# Patient Record
Sex: MALE | Race: WHITE
[De-identification: names, ages, dates, MRNs, and addresses within clinical notes are randomized per-mention and may not be internally consistent; named-entity substitution may affect disease eponyms.]

---

## 2019-09-27 ENCOUNTER — HOSPITAL ENCOUNTER (EMERGENCY)
Dept: HOSPITAL 72 - EMR | Age: 54
Discharge: HOME | End: 2019-09-27
Payer: MEDICAID

## 2019-09-27 VITALS — DIASTOLIC BLOOD PRESSURE: 86 MMHG | SYSTOLIC BLOOD PRESSURE: 131 MMHG

## 2019-09-27 VITALS — HEIGHT: 70 IN | BODY MASS INDEX: 28.49 KG/M2 | WEIGHT: 199 LBS

## 2019-09-27 DIAGNOSIS — K50.90: ICD-10-CM

## 2019-09-27 DIAGNOSIS — R51: Primary | ICD-10-CM

## 2019-09-27 LAB
ADD MANUAL DIFF: NO
ALBUMIN SERPL-MCNC: 3.2 G/DL (ref 3.4–5)
ALBUMIN/GLOB SERPL: 0.7 {RATIO} (ref 1–2.7)
ALP SERPL-CCNC: 91 U/L (ref 46–116)
ALT SERPL-CCNC: 22 U/L (ref 12–78)
ANION GAP SERPL CALC-SCNC: 12 MMOL/L (ref 5–15)
AST SERPL-CCNC: 19 U/L (ref 15–37)
BASOPHILS NFR BLD AUTO: 0.8 % (ref 0–2)
BILIRUB SERPL-MCNC: 0.5 MG/DL (ref 0.2–1)
BUN SERPL-MCNC: 11 MG/DL (ref 7–18)
CALCIUM SERPL-MCNC: 9 MG/DL (ref 8.5–10.1)
CHLORIDE SERPL-SCNC: 99 MMOL/L (ref 98–107)
CK SERPL-CCNC: 53 U/L (ref 26–308)
CO2 SERPL-SCNC: 27 MMOL/L (ref 21–32)
CREAT SERPL-MCNC: 1 MG/DL (ref 0.55–1.3)
EOSINOPHIL NFR BLD AUTO: 2.7 % (ref 0–3)
ERYTHROCYTE [DISTWIDTH] IN BLOOD BY AUTOMATED COUNT: 11.5 % (ref 11.6–14.8)
GLOBULIN SER-MCNC: 4.9 G/DL
HCT VFR BLD CALC: 38.9 % (ref 42–52)
HGB BLD-MCNC: 12.5 G/DL (ref 14.2–18)
LYMPHOCYTES NFR BLD AUTO: 20.3 % (ref 20–45)
MCV RBC AUTO: 84 FL (ref 80–99)
MONOCYTES NFR BLD AUTO: 8.3 % (ref 1–10)
NEUTROPHILS NFR BLD AUTO: 68 % (ref 45–75)
PLATELET # BLD: 334 K/UL (ref 150–450)
POTASSIUM SERPL-SCNC: 4.1 MMOL/L (ref 3.5–5.1)
RBC # BLD AUTO: 4.61 M/UL (ref 4.7–6.1)
SODIUM SERPL-SCNC: 137 MMOL/L (ref 136–145)
WBC # BLD AUTO: 10.6 K/UL (ref 4.8–10.8)

## 2019-09-27 PROCEDURE — 96375 TX/PRO/DX INJ NEW DRUG ADDON: CPT

## 2019-09-27 PROCEDURE — 93005 ELECTROCARDIOGRAM TRACING: CPT

## 2019-09-27 PROCEDURE — 85025 COMPLETE CBC W/AUTO DIFF WBC: CPT

## 2019-09-27 PROCEDURE — 80307 DRUG TEST PRSMV CHEM ANLYZR: CPT

## 2019-09-27 PROCEDURE — 96374 THER/PROPH/DIAG INJ IV PUSH: CPT

## 2019-09-27 PROCEDURE — 96361 HYDRATE IV INFUSION ADD-ON: CPT

## 2019-09-27 PROCEDURE — 80053 COMPREHEN METABOLIC PANEL: CPT

## 2019-09-27 PROCEDURE — 70450 CT HEAD/BRAIN W/O DYE: CPT

## 2019-09-27 PROCEDURE — 82550 ASSAY OF CK (CPK): CPT

## 2019-09-27 PROCEDURE — 83690 ASSAY OF LIPASE: CPT

## 2019-09-27 PROCEDURE — 99284 EMERGENCY DEPT VISIT MOD MDM: CPT

## 2019-09-27 PROCEDURE — 36415 COLL VENOUS BLD VENIPUNCTURE: CPT

## 2019-09-27 NOTE — NUR
ED Nurse Note:



Patient walked in to ER from the street due to severe headache x 3 days. Stated 
that never had headache like this, it is worse headache ever. Patient presented 
AAO x4, VSS at this time, skin is dry warm to touch.

## 2019-09-27 NOTE — EMERGENCY ROOM REPORT
History of Present Illness


General


Chief Complaint:  Headache


Source:  Patient





Present Illness


HPI


Patient presents with complaints of diffuse headache ongoing for the past 4 

days reports pain starting in the occipital region coming forward and involving 

the frontal sinus area





Pain has been slowly progressing and worsening denies any neck pain or 

photophobia denies any chest pain denies any back or flank pain


Denies any focal weakness denies any recent travel denies any rash


Pain is a throbbing and pressure type pain 8 out of 10





Denies any change with time of day denies any change with position or exertion


Allergies:  


Coded Allergies:  


     No Known Allergies (Unverified , 3/8/13)





Patient History


Past Medical History:  see triage record


Reviewed Nursing Documentation:  PMH: Agreed; PSxH: Agreed





Nursing Documentation-PMH


Hx Cardiac Problems:  No


Hx Cancer:  No


Hx Gastrointestinal Problems:  Yes - CROHN'S


Hx Neurological Problems:  No





Review of Systems


All Other Systems:  negative except mentioned in HPI





Physical Exam





Vital Signs








  Date Time  Temp Pulse Resp B/P (MAP) Pulse Ox O2 Delivery O2 Flow Rate FiO2


 


9/27/19 21:14 98.4 113 16 131/86 (101) 96 Room Air  








Sp02 EP Interpretation:  reviewed, normal


General Appearance:  well appearing, no apparent distress


Head:  normocephalic, atraumatic


Eyes:  bilateral eye PERRL, bilateral eye EOMI


ENT:  hearing grossly normal, normal pharynx, TMs + canals normal, uvula midline


Neck:  full range of motion, supple, no meningismus, no bony tend


Respiratory:  lungs clear, normal breath sounds, no rhonchi, no respiratory 

distress, no retraction, no accessory muscle use


Cardiovascular #1:  normal peripheral pulses, regular rate, rhythm, no edema, 

no gallop, no JVD, no murmur


Gastrointestinal:  normal bowel sounds, non tender, soft, no mass, no 

organomegaly, non-distended, no guarding, no hernia, no pulsatile mass, no 

rebound


Genitourinary:  no CVA tenderness


Musculoskeletal:  normal inspection


Neurologic:  oriented x3, responsive, CNs III-XII nml as tested, motor strength/

tone normal, sensory intact


Psychiatric:  mood/affect normal


Skin:  no rash


Lymphatic:  normal inspection, no adenopathy





Medical Decision Making


Diagnostic Impression:  


 Primary Impression:  


 Headache


ER Course


Multiple differentials including but not limited to neurological, neurosurgical

, infectious, metabolic pathology entertained patient has done


Significantly better with pain control


Extensive blood work and imaging was initiated all within normal limits patient 

remains hemodynamically stable repeat neurological exam is baseline and patient 

appropriate for close outpatient follow-up





Labs








Test


  9/27/19


21:40 9/27/19


21:50


 


White Blood Count


  10.6 K/UL


(4.8-10.8) 


 


 


Red Blood Count


  4.61 M/UL


(4.70-6.10) 


 


 


Hemoglobin


  12.5 G/DL


(14.2-18.0) 


 


 


Hematocrit


  38.9 %


(42.0-52.0) 


 


 


Mean Corpuscular Volume 84 FL (80-99)  


 


Mean Corpuscular Hemoglobin


  27.2 PG


(27.0-31.0) 


 


 


Mean Corpuscular Hemoglobin


Concent 32.2 G/DL


(32.0-36.0) 


 


 


Red Cell Distribution Width


  11.5 %


(11.6-14.8) 


 


 


Platelet Count


  334 K/UL


(150-450) 


 


 


Mean Platelet Volume


  7.9 FL


(6.5-10.1) 


 


 


Neutrophils (%) (Auto)


  68.0 %


(45.0-75.0) 


 


 


Lymphocytes (%) (Auto)


  20.3 %


(20.0-45.0) 


 


 


Monocytes (%) (Auto)


  8.3 %


(1.0-10.0) 


 


 


Eosinophils (%) (Auto)


  2.7 %


(0.0-3.0) 


 


 


Basophils (%) (Auto)


  0.8 %


(0.0-2.0) 


 


 


Sodium Level


  137 MMOL/L


(136-145) 


 


 


Potassium Level


  4.1 MMOL/L


(3.5-5.1) 


 


 


Chloride Level


  99 MMOL/L


() 


 


 


Carbon Dioxide Level


  27 MMOL/L


(21-32) 


 


 


Anion Gap


  12 mmol/L


(5-15) 


 


 


Blood Urea Nitrogen


  11 mg/dL


(7-18) 


 


 


Creatinine


  1.0 MG/DL


(0.55-1.30) 


 


 


Estimat Glomerular Filtration


Rate > 60 mL/min


(>60) 


 


 


Glucose Level


  101 MG/DL


() 


 


 


Calcium Level


  9.0 MG/DL


(8.5-10.1) 


 


 


Total Bilirubin


  0.5 MG/DL


(0.2-1.0) 


 


 


Aspartate Amino Transf


(AST/SGOT) 19 U/L (15-37) 


  


 


 


Alanine Aminotransferase


(ALT/SGPT) 22 U/L (12-78) 


  


 


 


Alkaline Phosphatase


  91 U/L


() 


 


 


Total Creatine Kinase


  53 U/L


() 


 


 


Total Protein


  8.1 G/DL


(6.4-8.2) 


 


 


Albumin


  3.2 G/DL


(3.4-5.0) 


 


 


Globulin 4.9 g/dL  


 


Albumin/Globulin Ratio 0.7 (1.0-2.7)  


 


Lipase


  77 U/L


() 


 


 


Urine Opiates Screen


  


  Negative


(NEGATIVE)


 


Urine Barbiturates Screen


  


  Negative


(NEGATIVE)


 


Phencyclidine (PCP) Screen


  


  Negative


(NEGATIVE)


 


Urine Amphetamines Screen


  


  Negative


(NEGATIVE)


 


Urine Benzodiazepines Screen


  


  Negative


(NEGATIVE)


 


Urine Cocaine Screen


  


  Negative


(NEGATIVE)


 


Urine Marijuana (THC) Screen


  


  Negative


(NEGATIVE)








Rhythm Strip Diag. Results


EP Interpretation:  yes


Rate:  77


Rhythm:  NSR, no PVC's, no ectopy





CT/MRI/US Diagnostic Results


CT/MRI/US Diagnostic Results :  


   Impression


CT head no acute disease





Last Vital Signs








  Date Time  Temp Pulse Resp B/P (MAP) Pulse Ox O2 Delivery O2 Flow Rate FiO2


 


9/27/19 21:14 98.4 113 16 131/86 (101) 96 Room Air  








Status:  improved


Disposition:  HOME, SELF-CARE


Condition:  Improved


Scripts


Ibuprofen* (MOTRIN*) 600 Mg Tablet


600 MG ORAL THREE TIMES A DAY, #20 TAB 0 Refills


   Prov: Kyle Singletary DO         9/27/19





Additional Instructions:  


Patient is provided with the discharge instructions notified to follow up with 

primary doctor in the next 2-3 days otherwise return to the er with any 

worsening symptoms.


Please note that this report is being documented using DRAGON technology.  This 

can lead to erroneous entry secondary to incorrect interpretation by the 

dictating instrument.











Kyle Singletary DO Sep 27, 2019 21:30

## 2019-09-30 NOTE — CARDIOLOGY REPORT
--------------- APPROVED REPORT --------------





EKG Measurement

Heart [id number]

AZ [id number]

[id number]

[id number]

[id number]





Sinus tachycardia

Inferior infarct, age undetermined

Cannot rule out Anterior infarct, age undetermined

Abnormal ECG

## 2019-10-02 ENCOUNTER — HOSPITAL ENCOUNTER (INPATIENT)
Dept: HOSPITAL 72 - EMR | Age: 54
LOS: 2 days | Discharge: ANOTHER HOSPITAL | DRG: 56 | End: 2019-10-04
Payer: MEDICAID

## 2019-10-02 VITALS — SYSTOLIC BLOOD PRESSURE: 122 MMHG | DIASTOLIC BLOOD PRESSURE: 78 MMHG

## 2019-10-02 VITALS — SYSTOLIC BLOOD PRESSURE: 138 MMHG | DIASTOLIC BLOOD PRESSURE: 86 MMHG

## 2019-10-02 VITALS — BODY MASS INDEX: 27.37 KG/M2 | WEIGHT: 195.5 LBS | HEIGHT: 71 IN

## 2019-10-02 VITALS — DIASTOLIC BLOOD PRESSURE: 84 MMHG | SYSTOLIC BLOOD PRESSURE: 151 MMHG

## 2019-10-02 VITALS — SYSTOLIC BLOOD PRESSURE: 146 MMHG | DIASTOLIC BLOOD PRESSURE: 73 MMHG

## 2019-10-02 DIAGNOSIS — R00.0: ICD-10-CM

## 2019-10-02 DIAGNOSIS — R10.9: ICD-10-CM

## 2019-10-02 DIAGNOSIS — K43.9: ICD-10-CM

## 2019-10-02 DIAGNOSIS — E87.2: ICD-10-CM

## 2019-10-02 DIAGNOSIS — N39.0: ICD-10-CM

## 2019-10-02 DIAGNOSIS — E46: ICD-10-CM
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
## 2019-10-02 DIAGNOSIS — K50.90: ICD-10-CM

## 2019-10-02 DIAGNOSIS — S06.379A: Primary | ICD-10-CM

## 2019-10-02 DIAGNOSIS — Z59.0: ICD-10-CM

## 2019-10-02 DIAGNOSIS — R55: ICD-10-CM

## 2019-10-02 DIAGNOSIS — K63.2: ICD-10-CM

## 2019-10-02 DIAGNOSIS — G91.9: ICD-10-CM

## 2019-10-02 DIAGNOSIS — E27.8: ICD-10-CM

## 2019-10-02 DIAGNOSIS — G92: ICD-10-CM

## 2019-10-02 LAB
ADD MANUAL DIFF: NO
ALBUMIN SERPL-MCNC: 3.3 G/DL (ref 3.4–5)
ALBUMIN/GLOB SERPL: 0.6 {RATIO} (ref 1–2.7)
ALP SERPL-CCNC: 100 U/L (ref 46–116)
ALT SERPL-CCNC: 25 U/L (ref 12–78)
ANION GAP SERPL CALC-SCNC: 18 MMOL/L (ref 5–15)
APPEARANCE UR: CLEAR
APTT PPP: YELLOW S
AST SERPL-CCNC: 24 U/L (ref 15–37)
BASOPHILS NFR BLD AUTO: 0.8 % (ref 0–2)
BILIRUB SERPL-MCNC: 0.6 MG/DL (ref 0.2–1)
BUN SERPL-MCNC: 22 MG/DL (ref 7–18)
CALCIUM SERPL-MCNC: 10 MG/DL (ref 8.5–10.1)
CHLORIDE SERPL-SCNC: 96 MMOL/L (ref 98–107)
CO2 SERPL-SCNC: 21 MMOL/L (ref 21–32)
CREAT SERPL-MCNC: 1.3 MG/DL (ref 0.55–1.3)
EOSINOPHIL NFR BLD AUTO: 1.8 % (ref 0–3)
ERYTHROCYTE [DISTWIDTH] IN BLOOD BY AUTOMATED COUNT: 12.5 % (ref 11.6–14.8)
GLOBULIN SER-MCNC: 5.3 G/DL
GLUCOSE UR STRIP-MCNC: NEGATIVE MG/DL
HCT VFR BLD CALC: 46.4 % (ref 42–52)
HGB BLD-MCNC: 14.8 G/DL (ref 14.2–18)
KETONES UR QL STRIP: (no result)
LEUKOCYTE ESTERASE UR QL STRIP: (no result)
LYMPHOCYTES NFR BLD AUTO: 13.3 % (ref 20–45)
MCV RBC AUTO: 83 FL (ref 80–99)
MONOCYTES NFR BLD AUTO: 7.8 % (ref 1–10)
NEUTROPHILS NFR BLD AUTO: 76.4 % (ref 45–75)
NITRITE UR QL STRIP: NEGATIVE
PH UR STRIP: 5 [PH] (ref 4.5–8)
PLATELET # BLD: 415 K/UL (ref 150–450)
POTASSIUM SERPL-SCNC: 3.8 MMOL/L (ref 3.5–5.1)
PROT UR QL STRIP: (no result)
RBC # BLD AUTO: 5.62 M/UL (ref 4.7–6.1)
SODIUM SERPL-SCNC: 135 MMOL/L (ref 136–145)
SP GR UR STRIP: 1.01 (ref 1–1.03)
UROBILINOGEN UR-MCNC: 1 MG/DL (ref 0–1)
WBC # BLD AUTO: 14.2 K/UL (ref 4.8–10.8)

## 2019-10-02 PROCEDURE — 96361 HYDRATE IV INFUSION ADD-ON: CPT

## 2019-10-02 PROCEDURE — 36415 COLL VENOUS BLD VENIPUNCTURE: CPT

## 2019-10-02 PROCEDURE — 94664 DEMO&/EVAL PT USE INHALER: CPT

## 2019-10-02 PROCEDURE — 84443 ASSAY THYROID STIM HORMONE: CPT

## 2019-10-02 PROCEDURE — 80061 LIPID PANEL: CPT

## 2019-10-02 PROCEDURE — 93880 EXTRACRANIAL BILAT STUDY: CPT

## 2019-10-02 PROCEDURE — 85025 COMPLETE CBC W/AUTO DIFF WBC: CPT

## 2019-10-02 PROCEDURE — 83690 ASSAY OF LIPASE: CPT

## 2019-10-02 PROCEDURE — 80329 ANALGESICS NON-OPIOID 1 OR 2: CPT

## 2019-10-02 PROCEDURE — 74177 CT ABD & PELVIS W/CONTRAST: CPT

## 2019-10-02 PROCEDURE — 93005 ELECTROCARDIOGRAM TRACING: CPT

## 2019-10-02 PROCEDURE — 96365 THER/PROPH/DIAG IV INF INIT: CPT

## 2019-10-02 PROCEDURE — 70450 CT HEAD/BRAIN W/O DYE: CPT

## 2019-10-02 PROCEDURE — 80053 COMPREHEN METABOLIC PANEL: CPT

## 2019-10-02 PROCEDURE — 87081 CULTURE SCREEN ONLY: CPT

## 2019-10-02 PROCEDURE — 81001 URINALYSIS AUTO W/SCOPE: CPT

## 2019-10-02 PROCEDURE — 99285 EMERGENCY DEPT VISIT HI MDM: CPT

## 2019-10-02 PROCEDURE — 84484 ASSAY OF TROPONIN QUANT: CPT

## 2019-10-02 PROCEDURE — 96367 TX/PROPH/DG ADDL SEQ IV INF: CPT

## 2019-10-02 PROCEDURE — 85730 THROMBOPLASTIN TIME PARTIAL: CPT

## 2019-10-02 PROCEDURE — 93306 TTE W/DOPPLER COMPLETE: CPT

## 2019-10-02 PROCEDURE — 85610 PROTHROMBIN TIME: CPT

## 2019-10-02 PROCEDURE — 96375 TX/PRO/DX INJ NEW DRUG ADDON: CPT

## 2019-10-02 PROCEDURE — 83880 ASSAY OF NATRIURETIC PEPTIDE: CPT

## 2019-10-02 PROCEDURE — 80307 DRUG TEST PRSMV CHEM ANLYZR: CPT

## 2019-10-02 RX ADMIN — HEPARIN SODIUM SCH UNITS: 5000 INJECTION INTRAVENOUS; SUBCUTANEOUS at 21:22

## 2019-10-02 SDOH — ECONOMIC STABILITY - HOUSING INSECURITY: HOMELESSNESS: Z59.0

## 2019-10-02 NOTE — CARDIOLOGY REPORT
--------------- APPROVED REPORT --------------





EXAM: Two-dimensional and M-mode echocardiogram with Doppler and color Doppler.



INDICATION

C.A.D



M-Mode DIMENSIONS 

IVSd1.2 (0.7-1.1cm)Left Atrium (MM)3.6 (1.6-4.0cm)

LVDd3.1 (3.5-5.6cm)Aortic Root3.4 (2.0-3.7cm)

PWd1.0 (0.7-1.1cm)Aortic Cusp Exc.2.0 (1.5-2.0cm)

IVSs1.6 cm

LVDs2.4 (2.5-4.0cm)

PWs0.9 cm



 Other Information 

Quality : Limited





Technically difficult study due to poor  acoustical windows .

Study quality precludes accurate  assessment of regional wall motion.

Normal left ventricular chamber size, systolic function and wall motion to extent 

visualized.

Left ventricular ejection fraction estimated to be  55 %.

No evidence of  left ventricular hypertrophy .

No evidence of pericardial effusion. 

All other cardiac chamber sizes are within normal limits. 

Valvular study not obtainable.

 Subcostal views not obtainable.

Trace mitral regurgitation.

## 2019-10-02 NOTE — NUR
ED Nurse Note:



Patient as admited to Tele due to generalized weakness. Patient was transfered 
to the unit via gurney by ACLS protocol, with all belongings. AAO x4, VSS at 
this time, skin is warm to touch.

## 2019-10-02 NOTE — DIAGNOSTIC IMAGING REPORT
Indication: Abdominal pain

 

Technique: Continuous helical transaxial imaging of the abdomen and pelvis was

obtained from the lung bases to the pubic symphysis during intravenous contrast

administration. Coronal 2-D reformats were also obtained. Study obtained in a Siemens

sensation 64 slice CT.  Automatic Exposure Control was utilized.

 

Total Dose length Product (DLP):  1497.7 mGycm

 

CT Dose Index Volume (CTDIvol):   43.3 mGy

 

Comparison: 3/8/2013

 

Findings: The lung bases are clear. There is artifact limiting evaluation. Large

bilateral low-density masses are present in the suprarenal location. These are likely

adrenal masses and have a approximate measurement on the left 8 x 7 cm, 8 x 6 cm on

the right. These are not seen on the last CT in 2013.

 

At the level of the umbilicus there is a prominent ventral hernia containing bowel

and mesenteric fat. The hernia sac has a transverse diameter of about 8 cm. The

actual abdominal wall defect diastases of the rectus muscles is about 3.8 cm

transversely. There is no associated bowel obstruction or definite CT evidence of

inflammation. Correlate clinically for incarceration.

 

Above the umbilicus there is a small fat-containing hernia in the epigastric region

anterior ventral wall.

 

Gallbladder is mildly distended. The patient has had previous bowel resection in the

right lower quadrant with staple line noted in the area of the cecum. The liver and

spleen are unremarkable. Kidneys show no hydronephrosis or other abnormalities.

Pancreas is unremarkable. Mild calcification of aorta noted. There is no ascites.

Bladder is unremarkable.

 

IMPRESSION:

 

Large low-density bilateral adrenal masses. Differential considerations include

bilateral adrenal hematomas, bilateral adrenal cysts or solid primary or secondary

neoplasm.

 

Prominent anterior ventral hernia measuring 8 cm containing both large and small

bowel and mesenteric fat. Correlate clinically for incarceration. No CT evidence of

obstruction.

 

Small fat-containing supraumbilical hernia.

 

Previous partial cecal resection.

 

Atherosclerotic vascular disease

 

[facility] has communicated the preliminary results to the Emergency

Department.  Their findings are largely concordant with this report.

 

 

 

The CT scanner at [facility] is accredited by the [employer] College of

Radiology and the scans are performed using dose optimization techniques as

appropriate to a performed exam including Automatic Exposure control.

## 2019-10-02 NOTE — NUR
NURSE NOTES:

Received report from JEANNE Goldsmith RN. regarding patient's arrival to TELE floor from ED. 
Arrived via gurney and transferred to bed with staff assist. Belongings checked and noted at 
bedside with RN. Head to toe assessment initiated with no skin issues noted. Orders received 
and carried out per MD. Continuous cardiac monitoring per protocol. Safety precaution in 
place; sidrails X3 up, call light within reach, bed in lowest position, brakes and alarm on 
at all times, and patient oriented to room. Needs and wants anticipated and attended. Will 
continue plan of care and monitor for any LIZA noted.



CV duplex scheduled tomorrow. 10/3/19

## 2019-10-02 NOTE — NUR
ED Nurse Note:pt. came with heart palpitations today, possible anxiety, no c/o 
chest pain, pt. is A/Ox4 ambulatory, EKG done, VSS

## 2019-10-03 VITALS — DIASTOLIC BLOOD PRESSURE: 75 MMHG | SYSTOLIC BLOOD PRESSURE: 136 MMHG

## 2019-10-03 VITALS — SYSTOLIC BLOOD PRESSURE: 120 MMHG | DIASTOLIC BLOOD PRESSURE: 74 MMHG

## 2019-10-03 VITALS — DIASTOLIC BLOOD PRESSURE: 79 MMHG | SYSTOLIC BLOOD PRESSURE: 139 MMHG

## 2019-10-03 VITALS — SYSTOLIC BLOOD PRESSURE: 104 MMHG | DIASTOLIC BLOOD PRESSURE: 83 MMHG

## 2019-10-03 VITALS — SYSTOLIC BLOOD PRESSURE: 119 MMHG | DIASTOLIC BLOOD PRESSURE: 80 MMHG

## 2019-10-03 VITALS — SYSTOLIC BLOOD PRESSURE: 115 MMHG | DIASTOLIC BLOOD PRESSURE: 78 MMHG

## 2019-10-03 VITALS — DIASTOLIC BLOOD PRESSURE: 88 MMHG | SYSTOLIC BLOOD PRESSURE: 147 MMHG

## 2019-10-03 VITALS — SYSTOLIC BLOOD PRESSURE: 108 MMHG | DIASTOLIC BLOOD PRESSURE: 95 MMHG

## 2019-10-03 VITALS — DIASTOLIC BLOOD PRESSURE: 72 MMHG | SYSTOLIC BLOOD PRESSURE: 121 MMHG

## 2019-10-03 LAB
ADD MANUAL DIFF: NO
ALBUMIN SERPL-MCNC: 2.9 G/DL (ref 3.4–5)
ALBUMIN/GLOB SERPL: 0.6 {RATIO} (ref 1–2.7)
ALP SERPL-CCNC: 86 U/L (ref 46–116)
ALT SERPL-CCNC: 26 U/L (ref 12–78)
ANION GAP SERPL CALC-SCNC: 13 MMOL/L (ref 5–15)
APTT BLD: 38 SEC (ref 23–33)
AST SERPL-CCNC: 28 U/L (ref 15–37)
BASOPHILS NFR BLD AUTO: 0.8 % (ref 0–2)
BILIRUB SERPL-MCNC: 0.5 MG/DL (ref 0.2–1)
BUN SERPL-MCNC: 13 MG/DL (ref 7–18)
CALCIUM SERPL-MCNC: 9.6 MG/DL (ref 8.5–10.1)
CHLORIDE SERPL-SCNC: 102 MMOL/L (ref 98–107)
CHOLEST SERPL-MCNC: 107 MG/DL (ref ?–200)
CO2 SERPL-SCNC: 21 MMOL/L (ref 21–32)
CREAT SERPL-MCNC: 0.9 MG/DL (ref 0.55–1.3)
EOSINOPHIL NFR BLD AUTO: 2.2 % (ref 0–3)
ERYTHROCYTE [DISTWIDTH] IN BLOOD BY AUTOMATED COUNT: 12.2 % (ref 11.6–14.8)
GLOBULIN SER-MCNC: 5 G/DL
HCT VFR BLD CALC: 42.1 % (ref 42–52)
HDLC SERPL-MCNC: 27 MG/DL (ref 40–60)
HGB BLD-MCNC: 13.7 G/DL (ref 14.2–18)
INR PPP: 1.1 (ref 0.9–1.1)
LYMPHOCYTES NFR BLD AUTO: 18.2 % (ref 20–45)
MCV RBC AUTO: 82 FL (ref 80–99)
MONOCYTES NFR BLD AUTO: 8.1 % (ref 1–10)
NEUTROPHILS NFR BLD AUTO: 70.7 % (ref 45–75)
PLATELET # BLD: 344 K/UL (ref 150–450)
POTASSIUM SERPL-SCNC: 3.4 MMOL/L (ref 3.5–5.1)
RBC # BLD AUTO: 5.16 M/UL (ref 4.7–6.1)
SODIUM SERPL-SCNC: 136 MMOL/L (ref 136–145)
TRIGL SERPL-MCNC: 158 MG/DL (ref 30–150)
WBC # BLD AUTO: 10.4 K/UL (ref 4.8–10.8)

## 2019-10-03 RX ADMIN — HEPARIN SODIUM SCH UNITS: 5000 INJECTION INTRAVENOUS; SUBCUTANEOUS at 09:00

## 2019-10-03 RX ADMIN — DEXAMETHASONE SODIUM PHOSPHATE SCH MG: 4 INJECTION, SOLUTION INTRA-ARTICULAR; INTRALESIONAL; INTRAMUSCULAR; INTRAVENOUS; SOFT TISSUE at 12:22

## 2019-10-03 RX ADMIN — DEXAMETHASONE SODIUM PHOSPHATE SCH MG: 4 INJECTION, SOLUTION INTRA-ARTICULAR; INTRALESIONAL; INTRAMUSCULAR; INTRAVENOUS; SOFT TISSUE at 18:20

## 2019-10-03 NOTE — PULMONOLGY CRITICAL CARE NOTE
Critical Care - Asmt/Plan


Problems:  


(1) Acute intracranial hemorrhage


(2) Acute encephalopathy


(3) Crohn disease


(4) Syncope


(5) Tachycardia


Respiratory:  monitor respiratory rate, adjust FIO2, CXR


Cardiac:  continue to monitor HR/BP


Renal:  F/U I&O


Infectious Disease:  check cultures


Gastrointestinal:  continue feedings/current rate


Endocrine:  monitor blood sugar, check TSH


Neurologic:  PRN Ativan, other - decadron IV, neuro evaluation


Affect:  PRN ativan


Disposition:  keep in ICU


Time Spent (Minutes):  40


Notes Reviewed:  renal


Discussed with:  nurses, consultants, 





Critical Care - Objective





Last 24 Hour Vital Signs








  Date Time  Temp Pulse Resp B/P (MAP) Pulse Ox O2 Delivery O2 Flow Rate FiO2


 


10/3/19 08:07 98.8    95 Room Air  


 


10/3/19 08:00  129      


 


10/3/19 08:00 98.4 130 18 131/95 (107) 97   


 


10/3/19 04:00  131      


 


10/3/19 04:00 97.5 120 20 121/72 (88) 95   


 


10/3/19 00:00 99.0 142 18 136/75 (95) 95   


 


10/3/19 00:00  125      


 


10/2/19 21:30  131      


 


10/2/19 21:00      Room Air  


 


10/2/19 20:30 98.9 139 18 146/73 (97) 95   


 


10/2/19 20:13      Room Air  


 


10/2/19 20:04 97.7 130 20 138/86 100 Room Air  


 


10/2/19 18:18  130 20 138/86 100 Room Air  


 


10/2/19 16:01 97.7 132 20 122/78 100 Room Air  


 


10/2/19 14:10 97.7 140 18 121/84 100 Room Air  


 


10/2/19 11:36 97.7 155 18 92/59 (70) 100 Room Air  








Status:  awake


Condition:  critical


Neck:  full ROM


Lungs:  clear


Heart:  HR/BP unstable


Abdomen:  soft, non-tender, feeding tube


Extremities:  edema





Critical Care - Subjective


ROS Limited/Unobtainable:  Yes


Interval Events:


54 year old brought in by paramedics with CC of syncopal episode.  He fell 

early this morning and a CT of head was done showing, Development of 2.4 cm 

acute left cerebellar hematoma with moderate edema, mass effectand compression 

of the fourth ventricle. pt is transferred to ICU for further monitoring while 

waiting to transfer to higher level. Pt looks comfortable, confused and not 

coherent.


I&O:











Intake and Output  


 


 10/2/19 10/3/19





 18:59 06:59


 


Intake Total 240 ml 


 


Output Total  400 ml


 


Balance 240 ml -400 ml


 


  


 


Intake Oral 240 ml 


 


Output Urine Total  400 ml


 


# Bowel Movements  2








Labs:





Laboratory Tests








Test


  10/2/19


12:20 10/2/19


17:30 10/3/19


06:39


 


White Blood Count


  14.2 K/UL


(4.8-10.8)  H 


  10.4 K/UL


(4.8-10.8)


 


Red Blood Count


  5.62 M/UL


(4.70-6.10) 


  5.16 M/UL


(4.70-6.10)


 


Hemoglobin


  14.8 G/DL


(14.2-18.0) 


  13.7 G/DL


(14.2-18.0)  L


 


Hematocrit


  46.4 %


(42.0-52.0) 


  42.1 %


(42.0-52.0)


 


Mean Corpuscular Volume 83 FL (80-99)    82 FL (80-99)  


 


Mean Corpuscular Hemoglobin


  26.2 PG


(27.0-31.0)  L 


  26.6 PG


(27.0-31.0)  L


 


Mean Corpuscular Hemoglobin


Concent 31.8 G/DL


(32.0-36.0)  L 


  32.6 G/DL


(32.0-36.0)


 


Red Cell Distribution Width


  12.5 %


(11.6-14.8) 


  12.2 %


(11.6-14.8)


 


Platelet Count


  415 K/UL


(150-450) 


  344 K/UL


(150-450)


 


Mean Platelet Volume


  7.1 FL


(6.5-10.1) 


  7.3 FL


(6.5-10.1)


 


Neutrophils (%) (Auto)


  76.4 %


(45.0-75.0)  H 


  70.7 %


(45.0-75.0)


 


Lymphocytes (%) (Auto)


  13.3 %


(20.0-45.0)  L 


  18.2 %


(20.0-45.0)  L


 


Monocytes (%) (Auto)


  7.8 %


(1.0-10.0) 


  8.1 %


(1.0-10.0)


 


Eosinophils (%) (Auto)


  1.8 %


(0.0-3.0) 


  2.2 %


(0.0-3.0)


 


Basophils (%) (Auto)


  0.8 %


(0.0-2.0) 


  0.8 %


(0.0-2.0)


 


Sodium Level


  135 MMOL/L


(136-145)  L 


  136 MMOL/L


(136-145)


 


Potassium Level


  3.8 MMOL/L


(3.5-5.1) 


  3.4 MMOL/L


(3.5-5.1)  L


 


Chloride Level


  96 MMOL/L


()  L 


  102 MMOL/L


()


 


Carbon Dioxide Level


  21 MMOL/L


(21-32) 


  21 MMOL/L


(21-32)


 


Anion Gap


  18 mmol/L


(5-15)  H 


  13 mmol/L


(5-15)


 


Blood Urea Nitrogen


  22 mg/dL


(7-18)  H 


  13 mg/dL


(7-18)


 


Creatinine


  1.3 MG/DL


(0.55-1.30) 


  0.9 MG/DL


(0.55-1.30)


 


Estimat Glomerular Filtration


Rate 57.5 mL/min


(>60) 


  > 60 mL/min


(>60)


 


Glucose Level


  114 MG/DL


()  H 


  88 MG/DL


()


 


Calcium Level


  10.0 MG/DL


(8.5-10.1) 


  9.6 MG/DL


(8.5-10.1)


 


Total Bilirubin


  0.6 MG/DL


(0.2-1.0) 


  0.5 MG/DL


(0.2-1.0)


 


Aspartate Amino Transf


(AST/SGOT) 24 U/L (15-37)


  


  28 U/L (15-37)


 


 


Alanine Aminotransferase


(ALT/SGPT) 25 U/L (12-78)


  


  26 U/L (12-78)


 


 


Alkaline Phosphatase


  100 U/L


() 


  86 U/L


()


 


Troponin I


  0.000 ng/mL


(0.000-0.056) 


  


 


 


Pro-B-Type Natriuretic Peptide


  165 pg/mL


(0-125)  H 


  


 


 


Total Protein


  8.6 G/DL


(6.4-8.2)  H 


  7.9 G/DL


(6.4-8.2)


 


Albumin


  3.3 G/DL


(3.4-5.0)  L 


  2.9 G/DL


(3.4-5.0)  L


 


Globulin 5.3 g/dL    5.0 g/dL  


 


Albumin/Globulin Ratio


  0.6 (1.0-2.7)


L 


  0.6 (1.0-2.7)


L


 


Lipase


  48 U/L


()  L 


  


 


 


Thyroid Stimulating Hormone


(TSH) 2.218 uiU/mL


(0.358-3.740) 


  1.715 uiU/mL


(0.358-3.740)


 


Serum Alcohol < 3 mg/dL    


 


Urine Color  Yellow   


 


Urine Appearance  Clear   


 


Urine pH  5 (4.5-8.0)   


 


Urine Specific Gravity


  


  1.015


(1.005-1.035) 


 


 


Urine Protein


  


  2+ (NEGATIVE)


H 


 


 


Urine Glucose (UA)


  


  Negative


(NEGATIVE) 


 


 


Urine Ketones


  


  4+ (NEGATIVE)


H 


 


 


Urine Blood


  


  1+ (NEGATIVE)


H 


 


 


Urine Nitrite


  


  Negative


(NEGATIVE) 


 


 


Urine Bilirubin


  


  1+ (NEGATIVE)


H 


 


 


Urine Ictotest


  


  Negative


(NEGATIVE) 


 


 


Urine Urobilinogen


  


  1 MG/DL


(0.0-1.0)  H 


 


 


Urine Leukocyte Esterase


  


  1+ (NEGATIVE)


H 


 


 


Urine RBC


  


  2-4 /HPF (0 -


0)  H 


 


 


Urine WBC


  


  0-2 /HPF (0 -


0) 


 


 


Urine Squamous Epithelial


Cells 


  None /LPF


(NONE/OCC) 


 


 


Urine Bacteria


  


  Few /HPF


(NONE) 


 


 


Urine Opiates Screen


  


  Negative


(NEGATIVE) 


 


 


Urine Barbiturates Screen


  


  Negative


(NEGATIVE) 


 


 


Phencyclidine (PCP) Screen


  


  Negative


(NEGATIVE) 


 


 


Urine Amphetamines Screen


  


  Negative


(NEGATIVE) 


 


 


Urine Benzodiazepines Screen


  


  Negative


(NEGATIVE) 


 


 


Urine Cocaine Screen


  


  Negative


(NEGATIVE) 


 


 


Urine Marijuana (THC) Screen


  


  Negative


(NEGATIVE) 


 


 


Prothrombin Time


  


  


  11.8 SEC


(9.30-11.50)  H


 


Prothromb Time International


Ratio 


  


  1.1 (0.9-1.1)  


 


 


Activated Partial


Thromboplast Time 


  


  38 SEC (23-33)


H


 


Triglycerides Level


  


  


  158 MG/DL


()  H


 


Cholesterol Level


  


  


  107 MG/DL (<


200)


 


LDL Cholesterol


  


  


  47 mg/dL


(<100)


 


HDL Cholesterol


  


  


  27 MG/DL


(40-60)  L


 


Cholesterol/HDL Ratio   4.0 (3.3-4.4)  

















Jey Navarro MD Oct 3, 2019 11:17

## 2019-10-03 NOTE — NUR
ST NOTE:

REFERRED BY DR CRUZ FOR SWALLOW EVAL. PER RN, PT JUST HAD AN ACUTE HEAD TRAUMA TODAY. 
WILL WAIT UNTIL TOMORROW FOR SWALLOW EVAL AND LIKELY WILL NEED A MOD BARIUM SWALLOW STUDY IF 
READY. CONSIDER KEEPING HIM NPO FOR NOW. ORAL CARE PRN.



D/W RN AND LEFT MESSAGE WITH DR CRUZ

## 2019-10-03 NOTE — GENERAL PROGRESS NOTE
Assessment/Plan


Problem List:  


(1) Ventral hernia


ICD Codes:  K43.9 - Ventral hernia without obstruction or gangrene


SNOMED:  321799672


(2) Syncope


ICD Codes:  R55 - Syncope and collapse


SNOMED:  319900499


(3) Enterocutaneous fistula


ICD Codes:  K63.2 - Fistula of intestine


SNOMED:  658922397


(4) Crohn disease


ICD Codes:  K50.90 - Crohn's disease, unspecified, without complications


SNOMED:  26749744


(5) Umbilical hernia


ICD Codes:  K42.9 - Umbilical hernia without obstruction or gangrene


SNOMED:  4141067, 290786202


(6) Acute intracranial hemorrhage


ICD Codes:  I62.9 - Nontraumatic intracranial hemorrhage, unspecified


SNOMED:  7142356, 0247976


Assessment/Plan:


speech eval


mesalamine


repeat labs


fu neurosurg





Subjective


ROS Limited/Unobtainable:  Yes


Allergies:  


Coded Allergies:  


     No Known Allergies (Unverified , 3/8/13)





Objective





Last 24 Hour Vital Signs








  Date Time  Temp Pulse Resp B/P (MAP) Pulse Ox O2 Delivery O2 Flow Rate FiO2


 


10/3/19 12:00  126      


 


10/3/19 12:00  126 28 104/83 (90) 97   


 


10/3/19 10:40 98.3 127 25 147/88 (107) 97   


 


10/3/19 09:00      Room Air  


 


10/3/19 08:07 98.8    95 Room Air  


 


10/3/19 08:00  129      


 


10/3/19 08:00 98.4 130 18 131/95 (107) 97   


 


10/3/19 04:00  131      


 


10/3/19 04:00 97.5 120 20 121/72 (88) 95   


 


10/3/19 00:00 99.0 142 18 136/75 (95) 95   


 


10/3/19 00:00  125      


 


10/2/19 21:30  131      


 


10/2/19 21:00      Room Air  


 


10/2/19 20:30 98.9 139 18 146/73 (97) 95   


 


10/2/19 20:13      Room Air  


 


10/2/19 20:04 97.7 130 20 138/86 100 Room Air  


 


10/2/19 18:18  130 20 138/86 100 Room Air  


 


10/2/19 16:01 97.7 132 20 122/78 100 Room Air  


 


10/2/19 14:10 97.7 140 18 121/84 100 Room Air  

















Intake and Output  


 


 10/2/19 10/3/19





 18:59 06:59


 


Intake Total 240 ml 


 


Output Total  400 ml


 


Balance 240 ml -400 ml


 


  


 


Intake Oral 240 ml 


 


Output Urine Total  400 ml


 


# Bowel Movements  2








Laboratory Tests


10/2/19 17:30: 


Urine Color Yellow, Urine Appearance Clear, Urine pH 5, Urine Specific Gravity 

1.015, Urine Protein 2+H, Urine Glucose (UA) Negative, Urine Ketones 4+H, Urine 

Blood 1+H, Urine Nitrite Negative, Urine Bilirubin 1+H, Urine Ictotest Negative

, Urine Urobilinogen 1H, Urine Leukocyte Esterase 1+H, Urine RBC 2-4H, Urine 

WBC 0-2, Urine Squamous Epithelial Cells None, Urine Bacteria Few, Urine 

Opiates Screen Negative, Urine Barbiturates Screen Negative, Phencyclidine (PCP

) Screen Negative, Urine Amphetamines Screen Negative, Urine Benzodiazepines 

Screen Negative, Urine Cocaine Screen Negative, Urine Marijuana (THC) Screen 

Negative


10/3/19 06:39: 


White Blood Count 10.4, Red Blood Count 5.16, Hemoglobin 13.7L, Hematocrit 42.1

, Mean Corpuscular Volume 82, Mean Corpuscular Hemoglobin 26.6L, Mean 

Corpuscular Hemoglobin Concent 32.6, Red Cell Distribution Width 12.2, Platelet 

Count 344, Mean Platelet Volume 7.3, Neutrophils (%) (Auto) 70.7, Lymphocytes (%

) (Auto) 18.2L, Monocytes (%) (Auto) 8.1, Eosinophils (%) (Auto) 2.2, Basophils 

(%) (Auto) 0.8, Prothrombin Time 11.8H, Prothromb Time International Ratio 1.1, 

Activated Partial Thromboplast Time 38H, Sodium Level 136, Potassium Level 3.4L

, Chloride Level 102, Carbon Dioxide Level 21, Anion Gap 13, Blood Urea 

Nitrogen 13, Creatinine 0.9, Estimat Glomerular Filtration Rate > 60, Glucose 

Level 88, Calcium Level 9.6, Total Bilirubin 0.5, Aspartate Amino Transf (AST/

SGOT) 28, Alanine Aminotransferase (ALT/SGPT) 26, Alkaline Phosphatase 86, 

Total Protein 7.9, Albumin 2.9L, Globulin 5.0, Albumin/Globulin Ratio 0.6L, 

Triglycerides Level 158H, Cholesterol Level 107, LDL Cholesterol 47, HDL 

Cholesterol 27L, Cholesterol/HDL Ratio 4.0, Thyroid Stimulating Hormone (TSH) 

1.715


Height (Feet):  5


Height (Inches):  11.00


Weight (Pounds):  195


General Appearance:  alert


EENT:  PERRL/EOMI


Neck:  supple


Cardiovascular:  normal rate


Respiratory/Chest:  decreased breath sounds


Abdomen:  soft, hypoactive bowel sounds, other - large ventral hernia











Arron Nogueira MD Oct 3, 2019 12:37

## 2019-10-03 NOTE — DIAGNOSTIC IMAGING REPORT
--------------- APPROVED REPORT --------------





CPT Code: 37430



Vascular Symptoms

Comments: Altered 

Heart Palpiations and chest pain



Doppler Spectral Velocity Analysis

RightLeft





RIGHT SIDE: Carotid BULB- Imaging reveals irregular, minimal plaque in the carotid bulb. 



and external carotid arteries. VERTEBRAL/SUBCLAVIAN- The vertebral and subclavian 

arteries are within normal limits.

LEFT SIDE: - Imaging reveals no significant plaque within the extracranial carotid 

arteries. The Doppler spectral flow analysis is within normal limits throughout the 

extracranial carotid arteries. VERTEBRAL/SUBCLAVIAN- The vertebral and subclavian 

arteries are within normal limits.

## 2019-10-03 NOTE — NUR
NURSE NOTES:



PATIENT KEPT CLEAN AND DRY. NOTED LOOSE BM. PATIENT IS RESPONSIVE. STILL ON O2 AT 2L VIA NC 
SATING %. NO SIGNS OF DISTRESS. WILL CONTINUE TO MONITOR.

## 2019-10-03 NOTE — NUR
NURSE NOTES:



RECEIVED PATIENT FROM TELE VIA HOSPITAL BED, REPORT GIVEN BY MILLIE HECTOR RN. PATIENT IS LYING 
IN BED, AWAKE. PATIENT MOANS AND SEEN HOLDING HIS HEAD AND ABLE TO FOLLOW SIMPLE COMMANDS. 
HOOKED TO CARDIAC MONITOR. HR OF  NOTED. SEEN AND EXAMINED BY DR THAPA WITH NEW 
ORDERS MADE. ON ROOM AIR BUT STARTED ON 2L NC SATING AT  %. NOTED SKIN ALTERATION. 
CALL LIGHT WITHIN REACH. SIDE RAILS UP. BED AT LOWEST POSITION. WILL CONTINUE TO MONITOR.

## 2019-10-03 NOTE — NUR
NURSE NOTES:

Received report from MELISSA Puckett. Patient is awake, drowsy and oriented x1. Able to follow 
commands. ST low 100s on the monitor. Vital signs  stable. Patient denies any 
pain/discomfort at this time.  NPO. Condom cath intact and and draining with yellow color 
urine. Left forearm 20G IV intact and SL. Kept dry, clean and comfortable.

## 2019-10-03 NOTE — NUR
NOTES





MD TO MD COMPLETED AT THIS TIME, PT ACCEPTED TO [facility]. ATTENDING MD DR. THOMPSON WITH 
DR. MAIDA JEREZ NEURO SURGEON. AUTHORIZATION TO ADMIT TO [facility] [id number]. 
AMBULANCE TO TRANSPORT [facility] WITH AUTHORIZATION # [id number]. WAITING FOR BED 
ASSIGNMENT.

-------------------------------------------------------------------------------

Addendum: 10/03/19 at 1731 by VIDHYA MAR RN RN

-------------------------------------------------------------------------------

SPOKE WITH BRIAN AT [facility] TRANSFER [facility]ENTER, CLINICALS FAXED,BRIAN WILL CALL 
NURSE STATION WHEN BED AVAILABLE. ONCE BED IS GIVEN NURSE TO CALL [facility] AND GIVE 
AUTH FOR  AND [facility]. REPORT GIVEN TO ICU CHARGE 
NURSE FOR FOLLOW UP.



BRIAN [phone] (P) (f) [phone]



[facility] [phone]

## 2019-10-03 NOTE — NUR
NURSE NOTES:

NAZIA Navarro MD. called back with NNO at this time. Will continue to monitor. Family called 
and left message of incident.

## 2019-10-03 NOTE — NUR
TRANSFER TO FLOOR:

Patient transferred to ICU, per Dr. Powers order.   Report given to Marianna Moreira.  Patient 
stable at time of transfer. Dr. Navarro is on the ICU floor. MD updated of patients 
condition.

## 2019-10-03 NOTE — NUR
NURSE NOTES:

Called and notified NAZIA Navarro MD. regarding incident of fall. NNO at this time. Will 
initiate incident report

## 2019-10-03 NOTE — NUR
HAND-OFF: 



Report given to LAKESHIA Greco RN. Still awaiting for bed from [facility]. Endorsed 
accordingly.

## 2019-10-03 NOTE — NUR
NURSE NOTES:



PATIENT SEEN RESTING IN BED BUT AROUSABLE. PATIENT IS RESPONSIVE. NO CARDIO OR RESPI 
DISTRESS AS OF THE MOMENT. WILL CONTINUE TO MONITOR.

## 2019-10-03 NOTE — NUR
NURSE NOTES:

alis from [facility] center called and inform me [facility] has an open bed in neuro icu(bed 5122) and said to arrange for transport

## 2019-10-03 NOTE — NUR
NOTES







SPOKE WITH ALEJO GOODWIN CASPER MADE AWARE OF PT REQUIRING HIGHER LEVEL OF CARE TRANSFER, 
PER ALEJO PT IS [facility] AND ENCOURAGED ME TO CALL THEM.



PLACED A CALL TO [facility] SPOKE WITH NATALIE, MADE AWARE OF PT REQUIRING TRANSFER TO A 
HIGHER LEVEL OF CARE. NATALIE WILL WORK ON FINDING A DOCTOR TO ACCEPT THIS PT. WILL FOLLOW 
UP. 



CLINICALS FAXED TO BOTH NATALIE AND ALEJO.



ALEJO (PENNIE) [phone] EXT [phone]

()  [phone]



NATALIE [facility] [phone] EXT [phone]

(I) [phone]

-------------------------------------------------------------------------------

Addendum: 10/03/19 at 1657 by VIDHYA MAR RN RN

-------------------------------------------------------------------------------

NO BED ASSIGNED AT THIS TIME. AFTER HOUR  WILL CALL THE UNIT WHEN BED AND 
ACCEPTING PHYSICIAN IS AVAILABLE.

## 2019-10-03 NOTE — NUR
HAND-OFF: 

Report given to MILLIE Galo PAtient in bed closely monitored for post fall status. Endorsed 
plan of care. .

## 2019-10-03 NOTE — NUR
NURSE NOTES:

Received patient from Marianna Osullivan. Patient S/P fall this am. neuro check started. Patient opens 
eyes spontaneously to voice able to track and focus. follow simple commands. positive ROM. 
denies pain at this time. VSS. MD was notified by  Day marianna re fall. patent for CT head . will 
follow.

-------------------------------------------------------------------------------

Addendum: 10/03/19 at 1552 by Charisma Galo RN

-------------------------------------------------------------------------------

above documentation has wrong time entry

## 2019-10-03 NOTE — NUR
NURSE NOTES:



PATIENT KEPT CLEAN AND DRY. ANSWERS WITH A YES/NO QUESTION. CAN MOVE HIS EXTREMITIES. WILL 
CONTINUE TO MONITOR.

## 2019-10-03 NOTE — NUR
NURSE NOTES:



PATIENT KEPT CLEAN AND DRY. NOTED THAT PATIENT IS MORE RESPONSIVE AND SEEN RESTING IN BED. 
WILL CONTINUE TO MONITOR.

## 2019-10-03 NOTE — NUR
NURSE NOTES:

CT head with no contrast ordered per fall protocol and family notified of incident. Will 
continue plan of care.

## 2019-10-03 NOTE — NUR
NURSE NOTES:



PATIENT SEEN TRYING TO GET OUT OF THE BED. PULLED THE CONDOM CATH. REPOSITIONED PATIENT. 
STILL ON O2 SATING %. NO SIGNS OF DISTRESS AS OF THE MOMENT. WILL CONTINUE TO MONITOR.

## 2019-10-03 NOTE — CARDIOLOGY PROGRESS NOTE
Assessment/Plan


Assessment/Plan


sinus tachy likey demand 


cerebellar hmatoma 


suprra renal masses ? adrenal hematomas 


s/p fall 








[facility] 


echo noted fritz lf fucntion 


ekg sinus tachy 


bp seem fin at thhis time 


ceck a cortisol 


ivf 


repet trop in am





Subjective


ROS Limited/Unobtainable:  Yes





Objective





Last 24 Hour Vital Signs








  Date Time  Temp Pulse Resp B/P (MAP) Pulse Ox O2 Delivery O2 Flow Rate FiO2


 


10/3/19 19:42  118 26  99 Room Air  21


 


10/3/19 16:00 98.5 128 25 120/74 (89) 95   


 


10/3/19 16:00  116      


 


10/3/19 12:00  126      


 


10/3/19 12:00  126 28 104/83 (90) 97   


 


10/3/19 12:00 98.4       


 


10/3/19 10:40 98.3 127 25 147/88 (107) 97   


 


10/3/19 09:00      Room Air  


 


10/3/19 08:07 98.8    95 Room Air  


 


10/3/19 08:00  129      


 


10/3/19 08:00 98.4 130 18 131/95 (107) 97   


 


10/3/19 04:00  131      


 


10/3/19 04:00 97.5 120 20 121/72 (88) 95   


 


10/3/19 00:00 99.0 142 18 136/75 (95) 95   


 


10/3/19 00:00  125      


 


10/2/19 21:30  131      


 


10/2/19 21:00      Room Air  


 


10/2/19 20:30 98.9 139 18 146/73 (97) 95   








General Appearance:  no apparent distress


Neck:  supple


Cardiovascular:  normal rate, tachycardia - mild


Respiratory/Chest:  lungs clear


Abdomen:  normal bowel sounds, non tender, soft


Extremities:  no swelling











Intake and Output  


 


 10/2/19 10/3/19





 19:00 07:00


 


Intake Total 240 ml 


 


Output Total  400 ml


 


Balance 240 ml -400 ml


 


  


 


Intake Oral 240 ml 


 


Output Urine Total  400 ml


 


# Bowel Movements  2











Laboratory Tests








Test


  10/3/19


06:39


 


White Blood Count


  10.4 K/UL


(4.8-10.8)


 


Red Blood Count


  5.16 M/UL


(4.70-6.10)


 


Hemoglobin


  13.7 G/DL


(14.2-18.0)  L


 


Hematocrit


  42.1 %


(42.0-52.0)


 


Mean Corpuscular Volume 82 FL (80-99)  


 


Mean Corpuscular Hemoglobin


  26.6 PG


(27.0-31.0)  L


 


Mean Corpuscular Hemoglobin


Concent 32.6 G/DL


(32.0-36.0)


 


Red Cell Distribution Width


  12.2 %


(11.6-14.8)


 


Platelet Count


  344 K/UL


(150-450)


 


Mean Platelet Volume


  7.3 FL


(6.5-10.1)


 


Neutrophils (%) (Auto)


  70.7 %


(45.0-75.0)


 


Lymphocytes (%) (Auto)


  18.2 %


(20.0-45.0)  L


 


Monocytes (%) (Auto)


  8.1 %


(1.0-10.0)


 


Eosinophils (%) (Auto)


  2.2 %


(0.0-3.0)


 


Basophils (%) (Auto)


  0.8 %


(0.0-2.0)


 


Prothrombin Time


  11.8 SEC


(9.30-11.50)  H


 


Prothromb Time International


Ratio 1.1 (0.9-1.1)  


 


 


Activated Partial


Thromboplast Time 38 SEC (23-33)


H


 


Sodium Level


  136 MMOL/L


(136-145)


 


Potassium Level


  3.4 MMOL/L


(3.5-5.1)  L


 


Chloride Level


  102 MMOL/L


()


 


Carbon Dioxide Level


  21 MMOL/L


(21-32)


 


Anion Gap


  13 mmol/L


(5-15)


 


Blood Urea Nitrogen


  13 mg/dL


(7-18)


 


Creatinine


  0.9 MG/DL


(0.55-1.30)


 


Estimat Glomerular Filtration


Rate > 60 mL/min


(>60)


 


Glucose Level


  88 MG/DL


()


 


Calcium Level


  9.6 MG/DL


(8.5-10.1)


 


Total Bilirubin


  0.5 MG/DL


(0.2-1.0)


 


Aspartate Amino Transf


(AST/SGOT) 28 U/L (15-37)


 


 


Alanine Aminotransferase


(ALT/SGPT) 26 U/L (12-78)


 


 


Alkaline Phosphatase


  86 U/L


()


 


Total Protein


  7.9 G/DL


(6.4-8.2)


 


Albumin


  2.9 G/DL


(3.4-5.0)  L


 


Globulin 5.0 g/dL  


 


Albumin/Globulin Ratio


  0.6 (1.0-2.7)


L


 


Triglycerides Level


  158 MG/DL


()  H


 


Cholesterol Level


  107 MG/DL (<


200)


 


LDL Cholesterol


  47 mg/dL


(<100)


 


HDL Cholesterol


  27 MG/DL


(40-60)  L


 


Cholesterol/HDL Ratio 4.0 (3.3-4.4)  


 


Thyroid Stimulating Hormone


(TSH) 1.715 uiU/mL


(0.358-3.740)

















Colton Weaver MD Oct 3, 2019 20:25

## 2019-10-03 NOTE — NUR
NURSE NOTES:

Received patient from Marianna Osullivan. Patient S/P fall this am. neuro check started. Patient opens 
eyes spontaneously to voice able to track and focus. follow simple commands. positive ROM. 
denies pain at this time. VSS. MD was notified by  Day rn re fall. patent for CT head . will 
follow.

## 2019-10-03 NOTE — NUR
NURSE NOTES:

Received call from radiology re: head CT result patient positive Left intracanial bleed in 
the cerebellum + hydrocephalus. Dr. Shore made aware ordered received patient to transfer 
ASAP and while awaiting for hospital,  transfer patient to ICU. MARIA E Stewart aware. Neuro 
check continues. Charge nurse/supervisor aware.

## 2019-10-03 NOTE — NUR
NURSE NOTES:



MELISSA ESCALANTE FROM TELE INFORMED DR ALANIS DINH K LEVEL. AWAITING FOR ORDER. WILL CONTINUE TO 
MONITOR.

## 2019-10-03 NOTE — HISTORY AND PHYSICAL REPORT
DATE OF ADMISSION:  10/02/2019

DATE AND TIME SEEN:  10/03/2019 at 2 p.m.



CONSULTANTS:

1. Jey Navarro M.D.

2. Arron Nogueira M.D.

3. Junito Petersen M.D.

4. Yash Cortes M.D.

5. Colton Weaver M.D.

6. Bacilio Joya M.D.



CHIEF COMPLAINT:  Syncope, tachycardia, history of recent fall, and

cerebellar bleed.



BRIEF HISTORY:  This is a 54-year-old male, who is homeless, presented to

[facility] last night with history of syncope and tachycardia with elevated

white count and abdominal pain as well.  The patient diagnosed with the

above, admitted to telemetry.  Subsequently apparently, there is a fall

and the patient may have sustained a cerebral bleed and the patient was

transferred to ICU.  Currently O2 NC, slightly confused in bed, in ICU,

not talking much.



REVIEW OF SYSTEMS:  Unavailable.



PAST MEDICAL HISTORY:  Nothing.



PAST SURGICAL HISTORY:  Unknown.



ALLERGIES:  Denies.



SOCIAL HISTORY:  Unable to obtain secondary to the patient's condition.



PHYSICAL EXAMINATION:

GENERAL:  Lethargic in bed, oriented x1, in no acute distress.  O2 NC in

place.

VITAL SIGNS:  Temperature is 98 degrees, pulse 126, respirations 28, blood

pressure 104/83.

CARDIOVASCULAR:  No murmurs.

LUNGS:  Poor exchange.

ABDOMEN:  Bowel sounds positive.  Nontender.  Nondistended.

EXTREMITIES:  No cyanosis or edema.

NEUROLOGIC:  The patient moves all extremities, but slightly weak.



LABORATORY DATA:  Labs at this time show initial white count of 14, now 10,

hemoglobin and hematocrit 13/42, platelets 344.  BMP shows potassium 3.4,

albumin 2.9.  Electrolytes otherwise normal.  INR is 1.1.  PTT is 30.

Urine tox is negative.  Urinalysis show 1+ leukocyte esterase.



MEDICATIONS:  Includes heparin, MiraLAX, Restoril, Asacol, Decadron,

Zofran, albuterol, Ativan, morphine, Mylanta, Catapres.



ASSESSMENT:

1. Syncope.

2. UTI.

3. Tachycardia.

4. Malnutrition.

5. Abdominal pain.

6. Metabolic acidosis.

7. Fall.

8. Cerebellar bleed.

9. Hydrocephalus.



PLAN:

1. Neurology followup.

2. Steroids ordered.

3. Transfer if possible to higher level.

4. Pain control.

5. Blood pressure control.

6. Dietary followup.

7. Antibiotics per Infectious Disease.

8. Nephrology followup.

9. Cardiology and Pulmonary followup as well.









  ______________________________________________

  Kyrie Shore D.O.





DR:  TONY

D:  10/03/2019 14:18

T:  10/03/2019 17:19

JOB#:  [id number]

CC:

## 2019-10-03 NOTE — NUR
55 YO MALE BIBA FROM THE STREET TO ER





CC    SYNCOPE EPISODE, 





SI-    GENERALIZED WEAKNESS, TACHYCARDIA

         T 97.7, HR  155, RR  18, BP  92/59

TOX SCREEN- NEG, WBC 14.2 

CT- LARGE LOW DENSITY BILATERAL ADRENAL MASSES AND HEMATOMA

CT/HEAD- 2.4 CM LEFT CEREBELLAR HEMATOMA 

ECHO DOPPLER- NO SIGNIFICANT FINDINGS





IS      THIAMINE IVP

         NS 1000ML IV BOLUS

         D5 1/2W/ KCL 1000ML IV

ATIVAN IV

FAMOTIDINE IV





*****ADMITTED TO TELE STATUS******

******TELE STATUS*******

## 2019-10-04 RX ADMIN — DEXAMETHASONE SODIUM PHOSPHATE SCH MG: 4 INJECTION, SOLUTION INTRA-ARTICULAR; INTRALESIONAL; INTRAMUSCULAR; INTRAVENOUS; SOFT TISSUE at 00:03

## 2019-10-04 NOTE — NUR
NURSE NOTES:

DC report given to MELISSA Florian @ [facility] Neuro/ICU, Tel#[phone]. 
Explained patient for transferring to [facility] and patient verbalized understanding. Will 
continue plan of care.

## 2019-10-04 NOTE — NUR
NURSE NOTES:

per Matti's instruction(), to call [facility], called at [phone] 
and spoke with marianna,requesting ambulance with [facility], she said no available ambulance at 
this time and the earliest will be at 10am.

## 2019-10-04 NOTE — NUR
NURSE NOTES:

[facility] and spoke with pilo ([phone]), ordered [facility],she 
said  time within 45 min to 1 hour

## 2019-10-04 NOTE — NUR
NURSE NOTES:

Inventory check done and patient signed belongings check list, given to [facility]. 
Patient is in stable condition. No distress/SOB noted. Patient denies any 
pain/discomfort/nausea/vomiting. Patient left with [facility] with 3 EMTs and 1 
RN via [employer]. Called MELISSA Florian @[facility] and confirmed transferring.

## 2019-10-04 NOTE — NUR
NURSE NOTES:

nursing supervisor called for another option of ambulance since [facility] is not available, 
she said to call [facility]

## 2019-10-07 NOTE — DISCHARGE SUMMARY
Discharge Summary


Discharge Summary


_


DATE OF ADMISSION: 10/2/2019





DATE OF DISCHARGE:  10/4/2019








DISCHARGED BY: Dr. Shore 





REASON FOR ADMISSION: 


54 years old male with past medical history of Crohn's disease with prior 

surgery, presented after possible syncopal episode.  


Patient reported increased abdominal discomfort , watery stools and feeling 

more lightheaded.  


He denied  hematemesis.  


He reported worsening generalized body aches and pain.


Upon evaluation vital signs revealed tachycardia with heart rate 155 and 

hypotension with   blood pressure 92/59.  


Pulse oximetry was stable on room air .


Laboratory work-up revealed leukocytosis WBC 14.2, stable hemoglobin  and 

hematocrit.


BUN 22,  creatinine 1.3.  


Glucose 114.  


Troponin negative.  pro .  EKG revealed sinus rhythm,  no acute ischemic 

changes. 


Stable LFT lipase 48. 


Serum alcohol less than 3.  Urine toxicology screen was negative.   


Patient started on the IV fluids and IV thiamine.   





CT of the abdomen and pelvis revealed large low-density bilateral adrenal 

masses.


Prominent anterior ventral hernia measuring 8 cm,  containing both large and 

small bowel and mesenteric fat. 


No CT evidence of obstruction.


Small fat-containing supraumbilical hernia.


Previous partial cecal resection.


Atherosclerotic vascular disease








 





CONSULTANTS:


hospitalist Dr. Navarro


GI specialist Dr. Nogueira


 


 


[facility] COURSE: 


Patient admitted to telemetry floor and started on IV fluids . 


Echocardiogram revealed preserved ejection fraction of 55% with no evidence of 

wall motion abnormality.  


No evidence of left ventricular hypertrophy.  


GI specialist followed.  Patient initially started on mesalamine. 


Patient apparently fell early morning 10/3.  Fall was unwitnessed. Patient  

appeared to be altered .


Subsequently CT of the head was done,  which revealed development of 2.4 cm 

acute left cerebellar hematoma with a moderate edema, mass-effect and 

compression of the fourth ventricle.  Associated effacement of the basal 

cistern and moderate obstructive hydrocephalus.


Patient subsequently was transferred to ICU.


Carotid duplex was essentially unremarkable.  


Patient was placed on the waiting list for transfer to higher level of care 

under neurosurgery services.  


Patient started on IV Decadron.  


Pain management was addressed.  


Supplemental oxygen titrated as needed to keep pulse oximetry above 92% ;  

bronchodilator therapy  was on board as needed. 


GI prophylaxis provided. 


Renal parameters and  electrolytes were closely monitored,  electrolytes 

corrected as needed , and nephrotoxins were avoided.  


Prior to transfer creatinine 0.9.  BUN 13. 


Leukocytosis resolved .


Placement was found and secured at [facility]  for neurosurgery 

evaluation and management.  


Patient was  transferred via [facility]. 





 











FINAL DIAGNOSES: 


Acute intracranial hemorrhage


Acute encephalopathy


Crohn's disease


Syncope


Tachycardia


Ventral hernia





DISCHARGE MEDICATIONS:


List of medication was sent with patient





DISCHARGE INSTRUCTIONS:


Patient was was transferred to [facility] via [facility]








I have been assigned to dictate discharge summary for this account. 


I was not involved in the patient's management.











Kailey Jauregui NP Oct 7, 2019 13:19

## 2019-10-12 NOTE — CARDIOLOGY REPORT
--------------- APPROVED REPORT --------------





EKG Measurement

Heart [id number]

NE [id number]

[id number]

[id number]

[id number]





Sinus tachycardia

Possible Lateral infarct, age undetermined

Inferior infarct, age undetermined

Abnormal ECG

## 2023-11-20 NOTE — DIAGNOSTIC IMAGING REPORT
Indication: Head trauma. Status post fall

 

Technique: Contiguous 5 mm thick transaxial imaging of the head obtained in a Siemens

Sensation 64 slice CT scanner.  Soft tissue and bone windows generated.  Automatic

Exposure Control was utilized.

 

 

Total Dose length Product (DLP):  1424 mGycm

 

CT Dose Index Volume (CTDIvol):   60 mGy

 

Comparison: 9/27/2019

 

Findings: In the posterior aspect of the left cerebellum there is a 2.4 cm round

hyperdensity consistent with an acute parenchymal bleed, not previously seen. There

is evidence of a low-density edema within the left cerebellum with mass effect on the

fourth ventricle which is small and displaced to the right.  This is resulted in

relative effacement of the basal cisterns surrounding the brainstem which are not

visualized. There is mass effect on the quadrigeminal plate cistern which is faintly

visualized. The suprasellar cistern is open. The third ventricle and lateral

ventricles are dilated and this has developed since last examination indicative of

obstructive hydrocephalus. This is likely on the basis of the fourth ventricle and/or

aqueduct compression.

 

Extracranial soft tissues are unremarkable. The calvarium and other osseous

structures appear intact. There is; thickening within the ethmoid sinus.

 

IMPRESSION:

 

Development of 2.4 cm acute left cerebellar hematoma with moderate edema, mass effect

and compression of the fourth ventricle. Associated effacement of the basal cisterns

and moderate obstructive hydrocephalus.

 

Although the acute hematoma is new and not appreciated on the previous study

performed 9/27/2019, there was in retrospect evidence of subtle low density edema

within the left cerebellum and mass effect on the fourth ventricle at that time. The

finding is subtle and difficult to see because of posterior fossa artifact. The

etiology of the edema is unknown and could be on the basis of an acute infarct, mass

or other pathology, which may be further evaluated with MRI. The hydrocephalus is new

and not appreciated on the prior CT.

 

Critical value communication.  Findings were discussed via telephone with Dr. Kyrie Shore @9:51 AM, 10/3/2019.

 

 

The CT scanner at [facility] is accredited by the American College of

Radiology and the scans are performed using dose optimization techniques as

appropriate to a performed exam including Automatic Exposure control. Tiarra Bernardo Norton has met all discharge criteria from Phase II. Vital Signs are stable, ambulating  without difficulty. Discharge instructions given, patient verbalized understanding. Discharged from facility via wheelchair in stable condition.